# Patient Record
Sex: MALE | Race: OTHER | NOT HISPANIC OR LATINO | URBAN - METROPOLITAN AREA
[De-identification: names, ages, dates, MRNs, and addresses within clinical notes are randomized per-mention and may not be internally consistent; named-entity substitution may affect disease eponyms.]

---

## 2021-07-16 ENCOUNTER — APPOINTMENT (EMERGENCY)
Dept: CT IMAGING | Facility: HOSPITAL | Age: 36
End: 2021-07-16

## 2021-07-16 ENCOUNTER — HOSPITAL ENCOUNTER (EMERGENCY)
Facility: HOSPITAL | Age: 36
Discharge: HOME | End: 2021-07-16
Attending: EMERGENCY MEDICINE | Admitting: EMERGENCY MEDICINE

## 2021-07-16 VITALS
TEMPERATURE: 98.5 F | BODY MASS INDEX: 34.05 KG/M2 | OXYGEN SATURATION: 98 % | WEIGHT: 216.93 LBS | RESPIRATION RATE: 14 BRPM | HEART RATE: 90 BPM | SYSTOLIC BLOOD PRESSURE: 127 MMHG | HEIGHT: 67 IN | DIASTOLIC BLOOD PRESSURE: 80 MMHG

## 2021-07-16 DIAGNOSIS — F10.929 ALCOHOL INTOXICATION (HCC): Primary | ICD-10-CM

## 2021-07-16 LAB
ATRIAL RATE: 100 BPM
P AXIS: 38 DEGREES
PR INTERVAL: 130 MS
QRS AXIS: 34 DEGREES
QRSD INTERVAL: 96 MS
QT INTERVAL: 350 MS
QTC INTERVAL: 451 MS
T WAVE AXIS: 13 DEGREES
VENTRICULAR RATE: 100 BPM

## 2021-07-16 PROCEDURE — 93010 ELECTROCARDIOGRAM REPORT: CPT | Performed by: INTERNAL MEDICINE

## 2021-07-16 PROCEDURE — 93005 ELECTROCARDIOGRAM TRACING: CPT

## 2021-07-16 PROCEDURE — 70450 CT HEAD/BRAIN W/O DYE: CPT

## 2021-07-16 PROCEDURE — 99284 EMERGENCY DEPT VISIT MOD MDM: CPT | Performed by: EMERGENCY MEDICINE

## 2021-07-16 PROCEDURE — 99285 EMERGENCY DEPT VISIT HI MDM: CPT

## 2021-07-16 NOTE — Clinical Note
Violetamaverick Shenhitesh was seen and treated in our emergency department on 7/16/2021  Diagnosis:     Kvng Givens  may return to work on return date  He may return on this date: 07/17/2021         If you have any questions or concerns, please don't hesitate to call        Fortunato Kapadia MD    ______________________________           _______________          _______________  Hospital Representative                              Date                                Time

## 2021-07-16 NOTE — ED PROVIDER NOTES
History  Chief Complaint   Patient presents with    Shortness of Breath     Patient brought in EMS for SOB that started not to long ago patient is ETOH and under police custoy      [id number] male presents for evaluation of alcohol intoxication  Patient apparently was brought to the emergency department by EMS after he called police to his place of residence  Patient is intoxicated but states that he called police because my neighbors were getting out of hand    It is unclear exactly what transpired but EMS seem to suggest that the patient requested to go to the hospital when police indicated that he could be arrested  Currently the patient offers no complaints, stating I was just drinking too much        History provided by:  Patient   used: No    Alcohol Problem  Location:  Generalized  Quality:  "I drank too much"  Severity:  Severe  Onset quality:  Gradual  Timing:  Constant  Progression:  Unable to specify  Chronicity:  New  Context:  I was drinking too much  Relieved by:  Nothing  Worsened by:  Nothing  Ineffective treatments:  None tried      None       History reviewed  No pertinent past medical history  Past Surgical History:   Procedure Laterality Date    ANKLE SURGERY         History reviewed  No pertinent family history  I have reviewed and agree with the history as documented  E-Cigarette/Vaping     E-Cigarette/Vaping Substances     Social History     Tobacco Use    Smoking status: Current Every Day Smoker    Smokeless tobacco: Never Used   Substance Use Topics    Alcohol use: Yes    Drug use: Not on file       Review of Systems   Reason unable to perform ROS: intoxicated  Physical Exam  Physical Exam  Vitals and nursing note reviewed  Constitutional:       Appearance: He is well-developed  HENT:      Head: Normocephalic and atraumatic     Eyes:      Conjunctiva/sclera: Conjunctivae normal    Cardiovascular:      Rate and Rhythm: Normal rate and regular rhythm  Heart sounds: No murmur heard  Pulmonary:      Effort: Pulmonary effort is normal  No respiratory distress  Breath sounds: Normal breath sounds  Abdominal:      Palpations: Abdomen is soft  Tenderness: There is no abdominal tenderness  Musculoskeletal:      Cervical back: Neck supple  Skin:     General: Skin is warm and dry  Capillary Refill: Capillary refill takes less than 2 seconds  Neurological:      General: No focal deficit present  Mental Status: He is alert and oriented to person, place, and time  Comments: Oriented to place, person, and time  He does appear intoxicated but has no focal deficits  Vital Signs  ED Triage Vitals   Temperature Pulse Respirations Blood Pressure SpO2   07/16/21 0538 07/16/21 0535 07/16/21 0535 07/16/21 0535 07/16/21 0535   98 5 °F (36 9 °C) 103 20 117/82 100 %      Temp Source Heart Rate Source Patient Position - Orthostatic VS BP Location FiO2 (%)   07/16/21 0535 07/16/21 0535 07/16/21 0535 07/16/21 0535 --   Oral Monitor Lying Right arm       Pain Score       07/16/21 0535       No Pain           Vitals:    07/16/21 0535 07/16/21 0600 07/16/21 0630   BP: 117/82 142/90 127/80   Pulse: 103 102 90   Patient Position - Orthostatic VS: Lying Lying Lying         Visual Acuity      ED Medications  Medications - No data to display    Diagnostic Studies  Results Reviewed     None                 CT head without contrast   Final Result by Robe Vasquez MD (07/16 0710)      No acute intracranial process  No skull fracture  Workstation performed: [id number]                    Procedures  Procedures         ED Course                             SBIRT 20yo+      Most Recent Value   SBIRT (23 yo +)   In order to provide better care to our patients, we are screening all of our patients for alcohol and drug use  Would it be okay to ask you these screening questions?   Unable to answer at this time Filed at: 07/16/2021 0544                    [facility]  Number of Diagnoses or Management Options  Alcohol intoxication [facility]): new and requires workup  Diagnosis management comments: 38 yo male with no significant pmh based on his report and review of the EMR presented by police for alcohol intoxication  He was intoxicated but offered no other complaints on my evaluation  PE was unremarkable and atraumatic aside from intoxication  Due to his intoxication a CT of the head was performed which was negative  Plan was to observe the patient in the ED until sober or until a sober friend/family member arrived who could appropriately supervise the patient  Unfortunately, prior to discharge, patient eloped from the ED unseen by ED staff  Authorities were contacted  Amount and/or Complexity of Data Reviewed  Tests in the radiology section of CPT®: reviewed and ordered  Review and summarize past medical records: yes        Disposition  Final diagnoses:   Alcohol intoxication ([address] )     Time reflects when diagnosis was documented in both MDM as applicable and the Disposition within this note     Time User Action Codes Description Comment    7/16/2021  7:28 AM Miguel Pulido Add [F10 9] Alcohol intoxication [facility])       ED Disposition     ED Disposition Condition Date/Time Comment    Eloped  Fri Jul 16, 2021  7:56 AM       Follow-up Information     Follow up With Specialties Details [address], DO Family Medicine   [id number]  [phone]            There are no discharge medications for this patient  No discharge procedures on file      PDMP Review     None          ED Provider  Electronically Signed by           Adore Dalton MD  08/02/21 6838

## 2021-07-16 NOTE — ED NOTES
Pt left his room unseen by emergency staff  Was supposed to get a ride but ride was not confirmed by this nurse  Charge nurse made aware   Police contacted to inform that patient left hospital       Heath Jay RN  07/16/21 1700